# Patient Record
Sex: FEMALE | Race: WHITE
[De-identification: names, ages, dates, MRNs, and addresses within clinical notes are randomized per-mention and may not be internally consistent; named-entity substitution may affect disease eponyms.]

---

## 2020-04-06 ENCOUNTER — HOSPITAL ENCOUNTER (EMERGENCY)
Dept: HOSPITAL 94 - ER | Age: 75
Discharge: HOME | End: 2020-04-06
Payer: MEDICARE

## 2020-04-06 VITALS — HEIGHT: 62 IN | WEIGHT: 180.38 LBS | BODY MASS INDEX: 33.19 KG/M2

## 2020-04-06 VITALS — DIASTOLIC BLOOD PRESSURE: 77 MMHG | SYSTOLIC BLOOD PRESSURE: 163 MMHG

## 2020-04-06 DIAGNOSIS — Z79.899: ICD-10-CM

## 2020-04-06 DIAGNOSIS — F17.200: ICD-10-CM

## 2020-04-06 DIAGNOSIS — M79.642: ICD-10-CM

## 2020-04-06 DIAGNOSIS — R07.9: ICD-10-CM

## 2020-04-06 DIAGNOSIS — I10: Primary | ICD-10-CM

## 2020-04-06 DIAGNOSIS — R06.02: ICD-10-CM

## 2020-04-06 LAB
ALBUMIN SERPL BCP-MCNC: 4.4 G/DL (ref 3.4–5)
ALBUMIN/GLOB SERPL: 1.8 {RATIO} (ref 1.1–1.5)
ALP SERPL-CCNC: 58 IU/L (ref 46–116)
ALT SERPL W P-5'-P-CCNC: 26 U/L (ref 12–78)
ANION GAP SERPL CALCULATED.3IONS-SCNC: 7 MMOL/L (ref 8–16)
AST SERPL W P-5'-P-CCNC: 18 U/L (ref 10–37)
BASOPHILS # BLD AUTO: 0.1 X10'3 (ref 0–0.2)
BASOPHILS NFR BLD AUTO: 1 % (ref 0–1)
BILIRUB SERPL-MCNC: 0.3 MG/DL (ref 0.1–1)
BUN SERPL-MCNC: 11 MG/DL (ref 7–18)
BUN/CREAT SERPL: 12.2 (ref 6.6–38)
CALCIUM SERPL-MCNC: 8.9 MG/DL (ref 8.5–10.1)
CHLORIDE SERPL-SCNC: 104 MMOL/L (ref 99–107)
CO2 SERPL-SCNC: 28.1 MMOL/L (ref 24–32)
CREAT SERPL-MCNC: 0.9 MG/DL (ref 0.4–0.9)
EOSINOPHIL # BLD AUTO: 0.2 X10'3 (ref 0–0.9)
EOSINOPHIL NFR BLD AUTO: 3 % (ref 0–6)
ERYTHROCYTE [DISTWIDTH] IN BLOOD BY AUTOMATED COUNT: 13.9 % (ref 11.5–14.5)
GFR SERPL CREATININE-BSD FRML MDRD: 61 ML/MIN
GLUCOSE SERPL-MCNC: 96 MG/DL (ref 70–104)
HCT VFR BLD AUTO: 38 % (ref 35–45)
HGB BLD-MCNC: 13.2 G/DL (ref 12–16)
LYMPHOCYTES # BLD AUTO: 1 X10'3 (ref 1.1–4.8)
LYMPHOCYTES NFR BLD AUTO: 16.6 % (ref 21–51)
MCH RBC QN AUTO: 29.9 PG (ref 27–31)
MCHC RBC AUTO-ENTMCNC: 34.7 G/DL (ref 33–36.5)
MCV RBC AUTO: 86.4 FL (ref 78–98)
MONOCYTES # BLD AUTO: 0.4 X10'3 (ref 0–0.9)
MONOCYTES NFR BLD AUTO: 6.7 % (ref 2–12)
NEUTROPHILS # BLD AUTO: 4.2 X10'3 (ref 1.8–7.7)
NEUTROPHILS NFR BLD AUTO: 72.7 % (ref 42–75)
PLATELET # BLD AUTO: 181 X10'3 (ref 140–440)
PMV BLD AUTO: 7.8 FL (ref 7.4–10.4)
POTASSIUM SERPL-SCNC: 3.8 MMOL/L (ref 3.5–5.1)
PROT SERPL-MCNC: 6.9 G/DL (ref 6.4–8.2)
RBC # BLD AUTO: 4.4 X10'6 (ref 4.2–5.6)
SODIUM SERPL-SCNC: 139 MMOL/L (ref 135–145)
WBC # BLD AUTO: 5.8 X10'3 (ref 4.5–11)

## 2020-04-06 PROCEDURE — 85025 COMPLETE CBC W/AUTO DIFF WBC: CPT

## 2020-04-06 PROCEDURE — 99285 EMERGENCY DEPT VISIT HI MDM: CPT

## 2020-04-06 PROCEDURE — 84484 ASSAY OF TROPONIN QUANT: CPT

## 2020-04-06 PROCEDURE — 80053 COMPREHEN METABOLIC PANEL: CPT

## 2020-04-06 PROCEDURE — 71045 X-RAY EXAM CHEST 1 VIEW: CPT

## 2020-04-06 PROCEDURE — 36415 COLL VENOUS BLD VENIPUNCTURE: CPT

## 2020-04-06 PROCEDURE — 93005 ELECTROCARDIOGRAM TRACING: CPT

## 2020-06-12 LAB
ALBUMIN SERPL BCP-MCNC: 4.4 G/DL (ref 3.4–5)
ALBUMIN/GLOB SERPL: 1.8 {RATIO} (ref 1.1–1.5)
ALP SERPL-CCNC: 56 IU/L (ref 46–116)
ALT SERPL W P-5'-P-CCNC: 29 U/L (ref 30–65)
ANION GAP SERPL CALCULATED.3IONS-SCNC: 7 MMOL/L (ref 8–16)
APTT PPP: 25 SECONDS (ref 22–32)
AST SERPL W P-5'-P-CCNC: 17 U/L (ref 10–37)
BACTERIA URNS QL MICRO: (no result) /HPF
BASOPHILS # BLD AUTO: 0.1 X10'3 (ref 0–0.2)
BASOPHILS NFR BLD AUTO: 0.9 % (ref 0–1)
BILIRUB SERPL-MCNC: 0.4 MG/DL (ref 0–1)
BUN SERPL-MCNC: 10 MG/DL (ref 7–18)
BUN/CREAT SERPL: 12.3 (ref 6.6–38)
CALCIUM SERPL-MCNC: 9.2 MG/DL (ref 8.5–10.1)
CHLORIDE SERPL-SCNC: 106 MMOL/L (ref 99–107)
CLARITY UR: CLEAR
CO2 SERPL-SCNC: 30.4 MMOL/L (ref 24–32)
COLOR UR: YELLOW
CREAT SERPL-MCNC: 0.81 MG/DL (ref 0.4–0.9)
DEPRECATED SQUAMOUS URNS QL MICRO: (no result) /LPF
EOSINOPHIL # BLD AUTO: 0.3 X10'3 (ref 0–0.9)
EOSINOPHIL NFR BLD AUTO: 5.1 % (ref 0–6)
ERYTHROCYTE [DISTWIDTH] IN BLOOD BY AUTOMATED COUNT: 13 % (ref 11.5–14.5)
GFR SERPL CREATININE-BSD FRML MDRD: 69 ML/MIN
GLUCOSE SERPL-MCNC: 151 MG/DL (ref 70–104)
GLUCOSE UR STRIP-MCNC: NEGATIVE MG/DL
HCT VFR BLD AUTO: 36.8 % (ref 35–45)
HGB BLD-MCNC: 12.8 G/DL (ref 12–16)
HGB UR QL STRIP: NEGATIVE
INR PPP: 0.9 INR
KETONES UR STRIP-MCNC: NEGATIVE MG/DL
LEUKOCYTE ESTERASE UR QL STRIP: (no result)
LYMPHOCYTES # BLD AUTO: 1.2 X10'3 (ref 1.1–4.8)
LYMPHOCYTES NFR BLD AUTO: 21.5 % (ref 21–51)
MCH RBC QN AUTO: 30.2 PG (ref 27–31)
MCHC RBC AUTO-ENTMCNC: 34.9 G/DL (ref 33–36.5)
MCV RBC AUTO: 86.5 FL (ref 78–98)
MONOCYTES # BLD AUTO: 0.4 X10'3 (ref 0–0.9)
MONOCYTES NFR BLD AUTO: 7.1 % (ref 2–12)
MUCOUS THREADS URNS QL MICRO: (no result) /LPF
NEUTROPHILS # BLD AUTO: 3.6 X10'3 (ref 1.8–7.7)
NEUTROPHILS NFR BLD AUTO: 65.4 % (ref 42–75)
NITRITE UR QL STRIP: NEGATIVE
PH UR STRIP: 7.5 [PH] (ref 4.8–8)
PLATELET # BLD AUTO: 154 X10'3 (ref 140–440)
PMV BLD AUTO: 8 FL (ref 7.4–10.4)
POTASSIUM SERPL-SCNC: 3.8 MMOL/L (ref 3.4–5.1)
PROT SERPL-MCNC: 6.8 G/DL (ref 6.4–8.2)
PROT UR QL STRIP: NEGATIVE MG/DL
PROTHROMBIN TIME: 9.8 SECONDS (ref 9–12)
RBC # BLD AUTO: 4.25 X10'6 (ref 4.2–5.6)
RBC #/AREA URNS HPF: (no result) /HPF (ref 0–2)
SODIUM SERPL-SCNC: 143 MMOL/L (ref 135–145)
SP GR UR STRIP: 1.01 (ref 1–1.03)
URN COLLECT METHOD CLASS: (no result)
UROBILINOGEN UR STRIP-MCNC: 0.2 E.U/DL (ref 0.2–1)
WBC #/AREA URNS HPF: (no result) /HPF (ref 0–4)

## 2020-06-18 ENCOUNTER — HOSPITAL ENCOUNTER (INPATIENT)
Dept: HOSPITAL 94 - PAS | Age: 75
LOS: 2 days | Discharge: HOME | DRG: 337 | End: 2020-06-20
Attending: SURGERY | Admitting: SURGERY
Payer: MEDICARE

## 2020-06-18 VITALS — BODY MASS INDEX: 32.02 KG/M2 | HEIGHT: 62 IN | WEIGHT: 174 LBS

## 2020-06-18 VITALS — DIASTOLIC BLOOD PRESSURE: 65 MMHG | SYSTOLIC BLOOD PRESSURE: 135 MMHG

## 2020-06-18 VITALS — DIASTOLIC BLOOD PRESSURE: 70 MMHG | SYSTOLIC BLOOD PRESSURE: 105 MMHG

## 2020-06-18 VITALS — DIASTOLIC BLOOD PRESSURE: 71 MMHG | SYSTOLIC BLOOD PRESSURE: 104 MMHG

## 2020-06-18 VITALS — DIASTOLIC BLOOD PRESSURE: 61 MMHG | SYSTOLIC BLOOD PRESSURE: 130 MMHG

## 2020-06-18 VITALS — DIASTOLIC BLOOD PRESSURE: 68 MMHG | SYSTOLIC BLOOD PRESSURE: 138 MMHG

## 2020-06-18 VITALS — DIASTOLIC BLOOD PRESSURE: 68 MMHG | SYSTOLIC BLOOD PRESSURE: 128 MMHG

## 2020-06-18 VITALS — DIASTOLIC BLOOD PRESSURE: 55 MMHG | SYSTOLIC BLOOD PRESSURE: 132 MMHG

## 2020-06-18 VITALS — SYSTOLIC BLOOD PRESSURE: 135 MMHG | DIASTOLIC BLOOD PRESSURE: 59 MMHG

## 2020-06-18 VITALS — DIASTOLIC BLOOD PRESSURE: 69 MMHG | SYSTOLIC BLOOD PRESSURE: 115 MMHG

## 2020-06-18 VITALS — SYSTOLIC BLOOD PRESSURE: 144 MMHG | DIASTOLIC BLOOD PRESSURE: 73 MMHG

## 2020-06-18 VITALS — SYSTOLIC BLOOD PRESSURE: 148 MMHG | DIASTOLIC BLOOD PRESSURE: 71 MMHG

## 2020-06-18 VITALS — SYSTOLIC BLOOD PRESSURE: 145 MMHG | DIASTOLIC BLOOD PRESSURE: 71 MMHG

## 2020-06-18 VITALS — DIASTOLIC BLOOD PRESSURE: 64 MMHG | SYSTOLIC BLOOD PRESSURE: 138 MMHG

## 2020-06-18 VITALS — DIASTOLIC BLOOD PRESSURE: 71 MMHG | SYSTOLIC BLOOD PRESSURE: 145 MMHG

## 2020-06-18 VITALS — SYSTOLIC BLOOD PRESSURE: 148 MMHG | DIASTOLIC BLOOD PRESSURE: 64 MMHG

## 2020-06-18 VITALS — SYSTOLIC BLOOD PRESSURE: 170 MMHG | DIASTOLIC BLOOD PRESSURE: 79 MMHG

## 2020-06-18 VITALS — SYSTOLIC BLOOD PRESSURE: 138 MMHG | DIASTOLIC BLOOD PRESSURE: 64 MMHG

## 2020-06-18 VITALS — DIASTOLIC BLOOD PRESSURE: 62 MMHG | SYSTOLIC BLOOD PRESSURE: 131 MMHG

## 2020-06-18 DIAGNOSIS — E11.9: ICD-10-CM

## 2020-06-18 DIAGNOSIS — E03.9: ICD-10-CM

## 2020-06-18 DIAGNOSIS — Z90.710: ICD-10-CM

## 2020-06-18 DIAGNOSIS — G89.29: ICD-10-CM

## 2020-06-18 DIAGNOSIS — Z53.31: ICD-10-CM

## 2020-06-18 DIAGNOSIS — I10: ICD-10-CM

## 2020-06-18 DIAGNOSIS — Z79.899: ICD-10-CM

## 2020-06-18 DIAGNOSIS — Z79.84: ICD-10-CM

## 2020-06-18 DIAGNOSIS — G47.30: ICD-10-CM

## 2020-06-18 DIAGNOSIS — K66.0: Primary | ICD-10-CM

## 2020-06-18 PROCEDURE — 85730 THROMBOPLASTIN TIME PARTIAL: CPT

## 2020-06-18 PROCEDURE — 87088 URINE BACTERIA CULTURE: CPT

## 2020-06-18 PROCEDURE — 82948 REAGENT STRIP/BLOOD GLUCOSE: CPT

## 2020-06-18 PROCEDURE — 80053 COMPREHEN METABOLIC PANEL: CPT

## 2020-06-18 PROCEDURE — 87081 CULTURE SCREEN ONLY: CPT

## 2020-06-18 PROCEDURE — 0DN80ZZ RELEASE SMALL INTESTINE, OPEN APPROACH: ICD-10-PCS | Performed by: SURGERY

## 2020-06-18 PROCEDURE — 81001 URINALYSIS AUTO W/SCOPE: CPT

## 2020-06-18 PROCEDURE — 85610 PROTHROMBIN TIME: CPT

## 2020-06-18 PROCEDURE — 3E0T3BZ INTRODUCTION OF ANESTHETIC AGENT INTO PERIPHERAL NERVES AND PLEXI, PERCUTANEOUS APPROACH: ICD-10-PCS | Performed by: ANESTHESIOLOGY

## 2020-06-18 PROCEDURE — 85025 COMPLETE CBC W/AUTO DIFF WBC: CPT

## 2020-06-18 PROCEDURE — 36415 COLL VENOUS BLD VENIPUNCTURE: CPT

## 2020-06-18 PROCEDURE — 80048 BASIC METABOLIC PNL TOTAL CA: CPT

## 2020-06-18 PROCEDURE — 83036 HEMOGLOBIN GLYCOSYLATED A1C: CPT

## 2020-06-18 RX ADMIN — CEFAZOLIN SODIUM SCH MLS/HR: 1 SOLUTION INTRAVENOUS at 17:05

## 2020-06-18 RX ADMIN — DEXTROSE, SODIUM CHLORIDE, AND POTASSIUM CHLORIDE SCH MLS/HR: 5; .45; .15 INJECTION INTRAVENOUS at 17:04

## 2020-06-18 RX ADMIN — DEXTROSE, SODIUM CHLORIDE, AND POTASSIUM CHLORIDE SCH MLS/HR: 5; .45; .15 INJECTION INTRAVENOUS at 22:11

## 2020-06-18 RX ADMIN — HYDROCODONE BITARTRATE AND ACETAMINOPHEN PRN TAB: 10; 325 TABLET ORAL at 17:06

## 2020-06-18 RX ADMIN — HYDROCODONE BITARTRATE AND ACETAMINOPHEN PRN TAB: 10; 325 TABLET ORAL at 22:10

## 2020-06-18 NOTE — NUR
Problems reprioritized. Patient report given, questions answered & plan of care reviewed 
with JULIANA Chacko.

## 2020-06-18 NOTE — NUR
Received from OR via SURGICAL BED, accompanied by Anesthesiologist HOA and report 
given by Anesthesiolgist. PATIENT DENIES PAIN. ABDOMINAL BANDAIDS PRESENT AND CDI. ISLAND TO 
MIDLINE ABDOMEN IS CDI BUT SPOTTED WITH BLOOD. SURGEON AWARE. 10L MASK ON WITH 96% 
SATURATIONS. SCDS DONNED. 20G PIV IN LEFT UE.


-------------------------------------------------------------------------------

Addendum: 06/18/20 at 1504 by Chip Verma RN, RN

-------------------------------------------------------------------------------

Amended: Links added.

## 2020-06-19 VITALS — SYSTOLIC BLOOD PRESSURE: 162 MMHG | DIASTOLIC BLOOD PRESSURE: 60 MMHG

## 2020-06-19 VITALS — DIASTOLIC BLOOD PRESSURE: 57 MMHG | SYSTOLIC BLOOD PRESSURE: 148 MMHG

## 2020-06-19 VITALS — SYSTOLIC BLOOD PRESSURE: 167 MMHG | DIASTOLIC BLOOD PRESSURE: 67 MMHG

## 2020-06-19 VITALS — SYSTOLIC BLOOD PRESSURE: 159 MMHG | DIASTOLIC BLOOD PRESSURE: 68 MMHG

## 2020-06-19 VITALS — SYSTOLIC BLOOD PRESSURE: 150 MMHG | DIASTOLIC BLOOD PRESSURE: 64 MMHG

## 2020-06-19 VITALS — SYSTOLIC BLOOD PRESSURE: 145 MMHG | DIASTOLIC BLOOD PRESSURE: 68 MMHG

## 2020-06-19 LAB
ALBUMIN SERPL BCP-MCNC: 3.4 G/DL (ref 3.4–5)
ANION GAP SERPL CALCULATED.3IONS-SCNC: 8 MMOL/L (ref 8–16)
BASOPHILS # BLD AUTO: 0 X10'3 (ref 0–0.2)
BASOPHILS NFR BLD AUTO: 0.1 % (ref 0–1)
BUN SERPL-MCNC: 8 MG/DL (ref 7–18)
BUN/CREAT SERPL: 9.9 (ref 6.6–38)
CALCIUM SERPL-MCNC: 8.1 MG/DL (ref 8.5–10.1)
CHLORIDE SERPL-SCNC: 108 MMOL/L (ref 99–107)
CO2 SERPL-SCNC: 27 MMOL/L (ref 24–32)
CREAT SERPL-MCNC: 0.81 MG/DL (ref 0.4–0.9)
EOSINOPHIL # BLD AUTO: 0 X10'3 (ref 0–0.9)
EOSINOPHIL NFR BLD AUTO: 0.1 % (ref 0–6)
ERYTHROCYTE [DISTWIDTH] IN BLOOD BY AUTOMATED COUNT: 13.4 % (ref 11.5–14.5)
GFR SERPL CREATININE-BSD FRML MDRD: 69 ML/MIN
GLUCOSE SERPL-MCNC: 201 MG/DL (ref 70–104)
HBA1C MFR BLD: 6.4 % (ref 4.5–6.2)
HCT VFR BLD AUTO: 34.4 % (ref 35–45)
HGB BLD-MCNC: 11.6 G/DL (ref 12–16)
LYMPHOCYTES # BLD AUTO: 0.4 X10'3 (ref 1.1–4.8)
LYMPHOCYTES NFR BLD AUTO: 3.3 % (ref 21–51)
MCH RBC QN AUTO: 30.1 PG (ref 27–31)
MCHC RBC AUTO-ENTMCNC: 33.9 G/DL (ref 33–36.5)
MCV RBC AUTO: 88.8 FL (ref 78–98)
MONOCYTES # BLD AUTO: 0.7 X10'3 (ref 0–0.9)
MONOCYTES NFR BLD AUTO: 5.9 % (ref 2–12)
NEUTROPHILS # BLD AUTO: 10.9 X10'3 (ref 1.8–7.7)
NEUTROPHILS NFR BLD AUTO: 90.6 % (ref 42–75)
PLATELET # BLD AUTO: 144 X10'3 (ref 140–440)
PMV BLD AUTO: 8.3 FL (ref 7.4–10.4)
POTASSIUM SERPL-SCNC: 4.4 MMOL/L (ref 3.5–5.1)
RBC # BLD AUTO: 3.87 X10'6 (ref 4.2–5.6)
SODIUM SERPL-SCNC: 143 MMOL/L (ref 135–145)
WBC # BLD AUTO: 12 X10'3 (ref 4.5–11)

## 2020-06-19 RX ADMIN — HYDROCODONE BITARTRATE AND ACETAMINOPHEN PRN TAB: 10; 325 TABLET ORAL at 16:06

## 2020-06-19 RX ADMIN — PANTOPRAZOLE SODIUM SCH MG: 40 TABLET, DELAYED RELEASE ORAL at 07:30

## 2020-06-19 RX ADMIN — LEVOTHYROXINE SODIUM SCH MCG: 100 TABLET ORAL at 07:30

## 2020-06-19 RX ADMIN — DEXTROSE, SODIUM CHLORIDE, AND POTASSIUM CHLORIDE SCH MLS/HR: 5; .45; .15 INJECTION INTRAVENOUS at 10:30

## 2020-06-19 RX ADMIN — DEXTROSE, SODIUM CHLORIDE, AND POTASSIUM CHLORIDE SCH MLS/HR: 5; .45; .15 INJECTION INTRAVENOUS at 00:47

## 2020-06-19 RX ADMIN — CEFAZOLIN SODIUM SCH MLS/HR: 1 SOLUTION INTRAVENOUS at 08:00

## 2020-06-19 RX ADMIN — HYDROCODONE BITARTRATE AND ACETAMINOPHEN PRN TAB: 10; 325 TABLET ORAL at 04:16

## 2020-06-19 RX ADMIN — HYDROCODONE BITARTRATE AND ACETAMINOPHEN PRN TAB: 10; 325 TABLET ORAL at 12:06

## 2020-06-19 RX ADMIN — CEFAZOLIN SODIUM SCH MLS/HR: 1 SOLUTION INTRAVENOUS at 00:43

## 2020-06-19 RX ADMIN — DEXTROSE, SODIUM CHLORIDE, AND POTASSIUM CHLORIDE SCH MLS/HR: 5; .45; .15 INJECTION INTRAVENOUS at 20:10

## 2020-06-19 NOTE — NUR
Patient in room [facility]. I have received report from  Ti ANDREWS  and had the opportunity to 
ask questions and assume patient care.

## 2020-06-19 NOTE — NUR
DM consult: Pt with A1c 6.4%, DM education not warranted at this time. Will continue to 
follow.

-------------------------------------------------------------------------------

Addendum: 06/19/20 at 1023 by Viktoria Garcia RD

-------------------------------------------------------------------------------

Amended: Links added.

## 2020-06-19 NOTE — NUR
Patient in room [facility]. I have received report from JULIANA Chacko   and had the opportunity to 
ask questions and assume patient care.

## 2020-06-19 NOTE — NUR
Problems reprioritized. Patient report given, questions answered & plan of care reviewed 
with JULIANA Borden.

## 2020-06-19 NOTE — NUR
F/C removed this morning @0530, Patient tolerated well. Patient aware to notified nursing 
staff when she needs to void.

## 2020-06-19 NOTE — NUR
pt stated she go up to go to the bathroom and felt cold. she stated she felt hot when she 
got in bed. Blood glucose was assessed and VSS. patient stated that she thinks " she may 
have had a panic attack.  I checked on the pt frequently.  got the pt a cold washcloth. pt 
stated she was feeling much better

## 2020-06-20 VITALS — SYSTOLIC BLOOD PRESSURE: 168 MMHG | DIASTOLIC BLOOD PRESSURE: 67 MMHG

## 2020-06-20 VITALS — DIASTOLIC BLOOD PRESSURE: 63 MMHG | SYSTOLIC BLOOD PRESSURE: 171 MMHG

## 2020-06-20 VITALS — SYSTOLIC BLOOD PRESSURE: 167 MMHG | DIASTOLIC BLOOD PRESSURE: 71 MMHG

## 2020-06-20 LAB
ALBUMIN SERPL BCP-MCNC: 3.5 G/DL (ref 3.4–5)
ANION GAP SERPL CALCULATED.3IONS-SCNC: 10 MMOL/L (ref 8–16)
BASOPHILS # BLD AUTO: 0 X10'3 (ref 0–0.2)
BASOPHILS NFR BLD AUTO: 0.4 % (ref 0–1)
BUN SERPL-MCNC: 6 MG/DL (ref 7–18)
BUN/CREAT SERPL: 7.5 (ref 6.6–38)
CALCIUM SERPL-MCNC: 8.2 MG/DL (ref 8.5–10.1)
CHLORIDE SERPL-SCNC: 107 MMOL/L (ref 99–107)
CO2 SERPL-SCNC: 26 MMOL/L (ref 24–32)
CREAT SERPL-MCNC: 0.8 MG/DL (ref 0.4–0.9)
EOSINOPHIL # BLD AUTO: 0.3 X10'3 (ref 0–0.9)
EOSINOPHIL NFR BLD AUTO: 3.1 % (ref 0–6)
ERYTHROCYTE [DISTWIDTH] IN BLOOD BY AUTOMATED COUNT: 13.4 % (ref 11.5–14.5)
GFR SERPL CREATININE-BSD FRML MDRD: 70 ML/MIN
GLUCOSE SERPL-MCNC: 200 MG/DL (ref 70–104)
HCT VFR BLD AUTO: 34.3 % (ref 35–45)
HGB BLD-MCNC: 11.6 G/DL (ref 12–16)
LYMPHOCYTES # BLD AUTO: 0.6 X10'3 (ref 1.1–4.8)
LYMPHOCYTES NFR BLD AUTO: 6.8 % (ref 21–51)
MCH RBC QN AUTO: 29.9 PG (ref 27–31)
MCHC RBC AUTO-ENTMCNC: 33.8 G/DL (ref 33–36.5)
MCV RBC AUTO: 88.3 FL (ref 78–98)
MONOCYTES # BLD AUTO: 0.6 X10'3 (ref 0–0.9)
MONOCYTES NFR BLD AUTO: 6.6 % (ref 2–12)
NEUTROPHILS # BLD AUTO: 7.7 X10'3 (ref 1.8–7.7)
NEUTROPHILS NFR BLD AUTO: 83.1 % (ref 42–75)
PLATELET # BLD AUTO: 150 X10'3 (ref 140–440)
PMV BLD AUTO: 8.2 FL (ref 7.4–10.4)
POTASSIUM SERPL-SCNC: 4.1 MMOL/L (ref 3.5–5.1)
RBC # BLD AUTO: 3.89 X10'6 (ref 4.2–5.6)
SODIUM SERPL-SCNC: 143 MMOL/L (ref 135–145)
WBC # BLD AUTO: 9.2 X10'3 (ref 4.5–11)

## 2020-06-20 RX ADMIN — DEXTROSE, SODIUM CHLORIDE, AND POTASSIUM CHLORIDE SCH MLS/HR: 5; .45; .15 INJECTION INTRAVENOUS at 03:52

## 2020-06-20 RX ADMIN — PANTOPRAZOLE SODIUM SCH MG: 40 TABLET, DELAYED RELEASE ORAL at 08:30

## 2020-06-20 RX ADMIN — LEVOTHYROXINE SODIUM SCH MCG: 100 TABLET ORAL at 07:11

## 2020-06-20 RX ADMIN — HYDROCODONE BITARTRATE AND ACETAMINOPHEN PRN TAB: 10; 325 TABLET ORAL at 10:59

## 2020-06-20 NOTE — NUR
Student Medication Administration:

For this medication-pass time frame, all medication were reviewed, dispensed, administered 
and documented per hospital policy by karl Lehman.

## 2020-06-20 NOTE — NUR
Problems reprioritized. Patient report given, questions answered & plan of care reviewed 
with Kasandra ANDREWS.

## 2020-06-20 NOTE — NUR
PT DISCHARGED IN STABLE CONDITION. LEFT FACILITY IN PRIVATE VEHICLE. IV DC [facility]ULA INTACT. 
FOLLOW UP INSTRUCTIONS GIVEN, ALL QUESTIONS ANSWERED. ALL BELONGINGS IN HAND INCLUDING RX 
FOR NORCO. 

-------------------------------------------------------------------------------

Addendum: 06/20/20 at 1350 by Jodee Benites RN

-------------------------------------------------------------------------------

Amended: Links added.

## 2020-06-20 NOTE — NUR
Student documentation:

I have reviewed and agree with all interventions, assessments performed and documented by 
[place], nursing student.

## 2024-06-13 NOTE — NUR
ALL CRITERIA FOR DC TO THE FLOOR HAS BEEN ACHIEVED. 2 RAILS UP. BED LOW, CALL LIGHT 
PRESENT. GAVE REPORT TO JULIANA KEENAN. PAIN AT A TOLERABLE LEVEL AT THIS TIME. DRESSINGS  CDI. 
CARE TURNED OVER TO RN. RN PRESENT TO ASSESS AND ACCEPT PATIENT AS WELL AS ASSIST IN SET UP 
OF PATIENT.


-------------------------------------------------------------------------------

Addendum: 06/18/20 at 1555 by Chip Frederick - RN RN

-------------------------------------------------------------------------------

Amended: Links added. [As Noted in HPI] : as noted in HPI [Negative] : Heme/Lymph